# Patient Record
Sex: FEMALE
[De-identification: names, ages, dates, MRNs, and addresses within clinical notes are randomized per-mention and may not be internally consistent; named-entity substitution may affect disease eponyms.]

---

## 2021-11-28 ENCOUNTER — NURSE TRIAGE (OUTPATIENT)
Dept: OTHER | Facility: CLINIC | Age: 46
End: 2021-11-28

## 2021-11-28 NOTE — TELEPHONE ENCOUNTER
Brief description of triage: She is calling from [place]. urinary pain for two weeks, had urinalysis and culture, and negative. She is in [place] and pain persists and now has pain in right lower back. Urinary frequency. Able to urinate. Had been taking Azo. Sex has been painful as well. No vaginal drainage. Pain after urination in urethra area. Triage indicates for patient to see within 4 hours. Assisted her in finding provider in network in [place]. No providers founds. She will proceed to convenience clinic. Care advice provided, patient verbalizes understanding; denies any other questions or concerns; instructed to call back for any new or worsening symptoms. This triage is a result of a call to [address] [place] [address]. Please do not respond to the triage nurse through this encounter. Any subsequent communication should be directly with the patient. Reason for Disposition   Pain or burning with passing urine (urination)    Answer Assessment - Initial Assessment Questions  1. LOCATION: \"Where does it hurt? \" (e.g., left, right)      Right flank/side,hip bone area. 2. ONSET: \"When did the pain start? \"      Yesterday    3. SEVERITY: \"How bad is the pain? \" (e.g., Scale 1-10; mild, moderate, or severe)    - MILD (1-3): doesn't interfere with normal activities     - MODERATE (4-7): interferes with normal activities or awakens from sleep     - SEVERE (8-10): excruciating pain and patient unable to do normal activities (stays in bed)        6-7/10    4. PATTERN: \"Does the pain come and go, or is it constant? \"       Constant    5. CAUSE: \"What do you think is causing the pain? \"      Thought she had UTI but cx was negative    6. OTHER SYMPTOMS:  \"Do you have any other symptoms? \" (e.g., fever, abdominal pain, vomiting, leg weakness, burning with urination, blood in urine)      Frequency and urgency. 7. PREGNANCY:  \"Is there any chance you are pregnant? \" \"When was your last menstrual period? \"      denies    Protocols used:  [facility] PAIN-ADULT-AH